# Patient Record
(demographics unavailable — no encounter records)

---

## 2024-10-09 NOTE — DISCUSSION/SUMMARY
[FreeTextEntry1] : 11 yr old female with complaints of nasal congestion and cough Like due to viral respiratory illness Tolerating fluids, no fevers and no respiratory distress PE unremarkable Recommend supportive care including antipyretics if needed, fluids as tolerated, OTC cough/cold medications if age-appropriate.  Return if symptoms worsen or persist.

## 2024-10-09 NOTE — HISTORY OF PRESENT ILLNESS
[de-identified] : ear ache [FreeTextEntry6] : runny nose with URI the past few days was in office with same two days ago no fever congested mild cough ear ache starting today no vomiting or diarrhea no other complaints

## 2024-10-09 NOTE — DISCUSSION/SUMMARY
[FreeTextEntry1] : URI Supportive care May use salt water nose drops Encourage fluids Humidifier in room at night Observe for signs of increased respiratory effort Follow up if any increase symptoms, or not improving.  ROM Amoxi for 10 days ear check in two weeks.

## 2024-10-09 NOTE — PHYSICAL EXAM
[Wheezing] : no wheezing [Rales] : no rales [Transmitted Upper Airway Sounds] : transmitted upper airway sounds [Tachypnea] : no tachypnea [Rhonchi] : no rhonchi [Subcostal Retractions] : no subcostal retractions [Suprasternal Retractions] : no suprasternal retractions [NL] : warm, clear

## 2024-10-09 NOTE — REVIEW OF SYSTEMS
[Fever] : no fever [Headache] : no headache [Eye Discharge] : no eye discharge [Eye Redness] : no eye redness [Ear Pain] : no ear pain [Nasal Discharge] : nasal discharge [Nasal Congestion] : nasal congestion [Tachypnea] : not tachypneic [Wheezing] : no wheezing [Cough] : cough [Congestion] : congestion [Negative] : Skin

## 2024-10-09 NOTE — HISTORY OF PRESENT ILLNESS
[de-identified] : cough and congestion [FreeTextEntry6] : 11 yr old female with complaints of cough and congestion of the nose for the last 4 days  Denies fever, rash, N/V/D Well appearing, no other concerns today Denies resp distress and sleeping well

## 2024-10-09 NOTE — HISTORY OF PRESENT ILLNESS
[de-identified] : cough and congestion [FreeTextEntry6] : 11 yr old female with complaints of cough and congestion of the nose for the last 4 days  Denies fever, rash, N/V/D Well appearing, no other concerns today Denies resp distress and sleeping well

## 2024-10-09 NOTE — PHYSICAL EXAM
[Clear Rhinorrhea] : clear rhinorrhea [Mucoid Discharge] : mucoid discharge [Wheezing] : no wheezing [Rales] : no rales [Tachypnea] : no tachypnea [Subcostal Retractions] : no subcostal retractions [Suprasternal Retractions] : no suprasternal retractions [NL] : soft, nontender, nondistended, normal bowel sounds, no hepatosplenomegaly [FreeTextEntry3] : RTM - loss of LM's.  significant erythema. beginnings of purulent fluid layer. [FreeTextEntry4] : congested

## 2024-10-09 NOTE — HISTORY OF PRESENT ILLNESS
[de-identified] : cough and congestion [FreeTextEntry6] : 11 yr old female with complaints of cough and congestion of the nose for the last 4 days  Denies fever, rash, N/V/D Well appearing, no other concerns today Denies resp distress and sleeping well

## 2024-10-09 NOTE — REVIEW OF SYSTEMS
Patient states he remembers being told to stop one of his meds before he had surgery on his left eye last year. Mentioned that patient should double check with Dr. Morin's office.   [Fever] : no fever [Headache] : no headache [Eye Discharge] : no eye discharge [Eye Redness] : no eye redness [Ear Pain] : no ear pain [Nasal Discharge] : nasal discharge [Nasal Congestion] : nasal congestion [Tachypnea] : not tachypneic [Wheezing] : no wheezing [Cough] : cough [Congestion] : congestion [Negative] : Skin

## 2024-10-15 NOTE — REVIEW OF SYSTEMS
[Ear Pain] : ear pain [Nasal Discharge] : nasal discharge [Nasal Congestion] : nasal congestion [Sore Throat] : sore throat [Negative] : Skin

## 2024-10-15 NOTE — PHYSICAL EXAM
[Erythema] : erythema [Clear Rhinorrhea] : clear rhinorrhea [Enlarged] : enlarged [NL] : warm, clear [Cerumen in canal] : cerumen in canal [Left] : (left) [Bulging] : bulging [Purulent Effusion] : purulent effusion [de-identified] : cervical

## 2024-10-15 NOTE — DISCUSSION/SUMMARY
[FreeTextEntry1] : This patient is an 11 year old female with no pmh who presents with a chief complaint of left ear pain. Patient was seen on 10/9 and diagnosed with a URI and ROM. At that time was prescribed amoxicillin which she has been taking. Right ear with TM still erythematous and bulging with purulent effusion. Left ear erythematous, but not bulging; however view obstructed by wax. Lungs clear to auscultation and patient afebrile so low concern for mycoplasma or pneumonia.   #ROM - Augmentin x10d prescribed - Discontinue amoxicillin - Recommended probiotics during antibiotic usage - Tylenol/motrin prn - If ear pain not resolved RTC in 2 weeks -if cough worsens with fever-cobsider mycoplasma coverage-discussed with mom  #Left ear cerumen - Recommended hydrogen peroxide warmed drops once a day  #URI - Encourage fluids - RTC if cough persists with fever - Supportive care - Follow up if any increase symptoms, or not improving.

## 2024-10-15 NOTE — HISTORY OF PRESENT ILLNESS
[de-identified] : Left Ear Pain [FreeTextEntry6] : This patient is an 11 year old female with no pmh who presents with a chief complaint of left ear pain. Patient was seen on 10/9 and diagnosed with a URI and ROM. Patient has been taking 500mg amoxicillin twice a day for 5 days. Right ear pain has improved, but patient notes decreased hearing and mild pain. Now her left ear hurts since last night. Notes the pain is similar to the pain previously in her ear. Motrin was given which helped the pain. Associated cough, congestion, rhinorrhea. No rash, vomiting, diarrhea, abdominal pain.   PMH: none PSH: none All: shrimp Meds: Amox Vax: UTD

## 2025-01-31 NOTE — REVIEW OF SYSTEMS
[Fever] : fever [Chills] : chills [Malaise] : malaise [Headache] : headache [Nasal Discharge] : nasal discharge [Nasal Congestion] : nasal congestion [Sore Throat] : sore throat [Cough] : cough [Congestion] : congestion [Negative] : Genitourinary [Difficulty with Sleep] : no difficulty with sleep [Change in Weight] : no change in weight [Night Sweats] : no night sweats [Ear Pain] : no ear pain [Sinus Pressure] : no sinus pressure [Shortness of Breath] : no shortness of breath

## 2025-01-31 NOTE — PHYSICAL EXAM
[Clear Rhinorrhea] : clear rhinorrhea [Erythematous Oropharynx] : erythematous oropharynx [NL] : warm, clear [Vesicles] : no vesicles [Exudate] : no exudate [FreeTextEntry8] : tachycardia

## 2025-01-31 NOTE — HISTORY OF PRESENT ILLNESS
[Fever] : FEVER [___ Day(s)] : [unfilled] day(s) [Intermittent] : intermittent [Fatigued] : fatigued [Sick Contacts: ___] : sick contacts: [unfilled] [Acetaminophen] : acetaminophen [Last dose: _____] : last dose: [unfilled] [Malaise] : malaise [Headache] : headache [Runny Nose] : runny nose [Nasal Congestion] : nasal congestion [Sore Throat] : sore throat [Cough] : cough [Decreased Appetite] : decreased appetite [Myalgia] : myalgia [Max Temp: ____] : Max temperature: [unfilled] [Known Exposure to COVID-19] : no known exposure to COVID-19 [History of recent COVID-19 infection] : no history of recent COVID-19 infection [Change in sleep pattern] : no change in sleep pattern [Eye Redness] : no eye redness [Eye Discharge] : no eye discharge [Ear Pain] : no ear pain [Wheezing] : no wheezing [Vomiting] : no vomiting [Diarrhea] : no diarrhea [Decreased Urine Output] : no decreased urine output [Rash] : no rash [Loss of taste] : no loss of taste [Loss of smell] : no loss of smell

## 2025-02-20 NOTE — HISTORY OF PRESENT ILLNESS
[FreeTextEntry1] : NP spots [de-identified] : 02/20/2025 01:34 PM NOREEN WEST is a 12 year F presenting today for evaluation of the following:  # rash on upper right chest x 1 week - slightly itchy, not improving with OTC antifungals, thought it was ringworm - also with bumps on the back that appeared afterwards - she was sick with maybe the flu a little over 1 week ago  # rash on L upper arm x 5 years - used to be more raised and bumpy, asx now

## 2025-02-20 NOTE — ASSESSMENT
[Use of independent historian: [ enter independent historian's relationship to patient ] :____] : As the patient was unable to provide a complete and reliable history, I obtained clinical history from the patient's [unfilled] [FreeTextEntry1] : #Pityriasis rosea, trunk, acute - education, counseling including most likely etiology is viral. - Discussed that condition is most often self-limited and will resolve on its own in several weeks to months (most commonly 8 weeks but can vary). Inverse OR or atypical versions can take longer to resolve - start gentle skin care with liberal emollients, ie petrolatum, cerave/cetaphil, aveeno - can symptomatically treat pruritus with triamcinolone 0.1% ointment BID x 2 weeks on, 1 week off to affected areas, not for face, groin, or axillae, SED.   #Favor blaschkitis, chronic, resolving with hypopigmentation on left upper arm - ddx includes resolving linear LP (this would be more likely to resolve with postinflammatory HYPERpigmentation and than hypo) - Counseled on benign nature and course of condition - Can use triamcinolone ointment as above PRN itchiness - can reassess at [place]  # Acne, face, chronic, mild, comedonal - use OTC salicylic acid face wash  RTC 3 mo

## 2025-02-20 NOTE — PHYSICAL EXAM
[FreeTextEntry3] : hypopigmented blashkoid grouping of macules and papules on the left upper inner arm scaly oval pink patch on the right upper chest scattered pink scaly papules on the back few acneiform papules on chin

## 2025-03-12 NOTE — HISTORY OF PRESENT ILLNESS
[Mother] : mother [Yes] : Patient goes to dentist yearly [Up to date] : Up to date [Premenarche] : premenarche [Eats meals with family] : eats meals with family [Has family members/adults to turn to for help] : has family members/adults to turn to for help [Is permitted and is able to make independent decisions] : Is permitted and is able to make independent decisions [Normal Performance] : normal performance [Normal Behavior/Attention] : normal behavior/attention [Normal Homework] : normal homework [Eats regular meals including adequate fruits and vegetables] : eats regular meals including adequate fruits and vegetables [Drinks non-sweetened liquids] : drinks non-sweetened liquids  [Has friends] : has friends [At least 1 hour of physical activity a day] : at least 1 hour of physical activity a day [Screen time (except homework) less than 2 hours a day] : screen time (except homework) less than 2 hours a day

## 2025-03-12 NOTE — DISCUSSION/SUMMARY
[Normal Growth] : growth [Normal Development] : development  [No Elimination Concerns] : elimination [Continue Regimen] : feeding [No Skin Concerns] : skin [Normal Sleep Pattern] : sleep [None] : no medical problems [Anticipatory Guidance Given] : Anticipatory guidance addressed as per the history of present illness section [No Vaccines] : no vaccines needed [No Medications] : ~He/She~ is not on any medications [Patient] : patient [Parent/Guardian] : Parent/Guardian [FreeTextEntry1] :  SDOH screening reviewed and scored